# Patient Record
Sex: FEMALE | Race: WHITE | Employment: UNEMPLOYED | URBAN - METROPOLITAN AREA
[De-identification: names, ages, dates, MRNs, and addresses within clinical notes are randomized per-mention and may not be internally consistent; named-entity substitution may affect disease eponyms.]

---
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
## 2024-10-29 NOTE — FLOWSHEET NOTE
Updates with guardian Dorinda Jordan at [phone].  Bringing custody paperwork to ED to fax to [facility]